# Patient Record
Sex: FEMALE | Race: AMERICAN INDIAN OR ALASKA NATIVE
[De-identification: names, ages, dates, MRNs, and addresses within clinical notes are randomized per-mention and may not be internally consistent; named-entity substitution may affect disease eponyms.]

---

## 2018-05-10 ENCOUNTER — HOSPITAL ENCOUNTER (EMERGENCY)
Dept: HOSPITAL 5 - ED | Age: 73
LOS: 1 days | Discharge: HOME | End: 2018-05-11
Payer: MEDICARE

## 2018-05-10 DIAGNOSIS — E11.22: ICD-10-CM

## 2018-05-10 DIAGNOSIS — R53.81: Primary | ICD-10-CM

## 2018-05-10 DIAGNOSIS — N18.9: ICD-10-CM

## 2018-05-10 DIAGNOSIS — I12.9: ICD-10-CM

## 2018-05-10 LAB
ALBUMIN SERPL-MCNC: 2.4 G/DL (ref 3.9–5)
ALT SERPL-CCNC: 21 UNITS/L (ref 7–56)
BACTERIA #/AREA URNS HPF: (no result) /HPF
BILIRUB UR QL STRIP: (no result)
BLOOD UR QL VISUAL: (no result)
BUN SERPL-MCNC: 65 MG/DL (ref 7–17)
BUN/CREAT SERPL: 18 %
CALCIUM SERPL-MCNC: 8.1 MG/DL (ref 8.4–10.2)
HCT VFR BLD CALC: 31.7 % (ref 30.3–42.9)
HEMOLYSIS INDEX: 7
HGB BLD-MCNC: 9.7 GM/DL (ref 10.1–14.3)
INR PPP: 1.03 (ref 0.87–1.13)
MCH RBC QN AUTO: 24 PG (ref 28–32)
MCHC RBC AUTO-ENTMCNC: 31 % (ref 30–34)
MCV RBC AUTO: 79 FL (ref 79–97)
PH UR STRIP: 7 [PH] (ref 5–7)
PLATELET # BLD: 321 K/MM3 (ref 140–440)
RBC # BLD AUTO: 4 M/MM3 (ref 3.65–5.03)
RBC #/AREA URNS HPF: 15 /HPF (ref 0–6)
UROBILINOGEN UR-MCNC: < 2 MG/DL (ref ?–2)
WBC #/AREA URNS HPF: 150 /HPF (ref 0–6)

## 2018-05-10 PROCEDURE — 96372 THER/PROPH/DIAG INJ SC/IM: CPT

## 2018-05-10 PROCEDURE — 51702 INSERT TEMP BLADDER CATH: CPT

## 2018-05-10 PROCEDURE — 93005 ELECTROCARDIOGRAM TRACING: CPT

## 2018-05-10 PROCEDURE — 82962 GLUCOSE BLOOD TEST: CPT

## 2018-05-10 PROCEDURE — 36415 COLL VENOUS BLD VENIPUNCTURE: CPT

## 2018-05-10 PROCEDURE — 84443 ASSAY THYROID STIM HORMONE: CPT

## 2018-05-10 PROCEDURE — 87086 URINE CULTURE/COLONY COUNT: CPT

## 2018-05-10 PROCEDURE — 83735 ASSAY OF MAGNESIUM: CPT

## 2018-05-10 PROCEDURE — 85610 PROTHROMBIN TIME: CPT

## 2018-05-10 PROCEDURE — 85027 COMPLETE CBC AUTOMATED: CPT

## 2018-05-10 PROCEDURE — 93010 ELECTROCARDIOGRAM REPORT: CPT

## 2018-05-10 PROCEDURE — 71045 X-RAY EXAM CHEST 1 VIEW: CPT

## 2018-05-10 PROCEDURE — 84100 ASSAY OF PHOSPHORUS: CPT

## 2018-05-10 PROCEDURE — 81001 URINALYSIS AUTO W/SCOPE: CPT

## 2018-05-10 PROCEDURE — 80053 COMPREHEN METABOLIC PANEL: CPT

## 2018-05-10 PROCEDURE — 82550 ASSAY OF CK (CPK): CPT

## 2018-05-10 RX ADMIN — AMLODIPINE BESYLATE SCH MG: 10 TABLET ORAL at 22:42

## 2018-05-10 RX ADMIN — CARVEDILOL SCH MG: 25 TABLET, FILM COATED ORAL at 22:42

## 2018-05-10 RX ADMIN — PANTOPRAZOLE SODIUM SCH MG: 40 TABLET, DELAYED RELEASE ORAL at 22:47

## 2018-05-10 NOTE — XRAY REPORT
FINAL REPORT



PROCEDURE:  XR CHEST 1V AP



TECHNIQUE:  Chest radiograph anteroposterior view. CPT 43955







HISTORY:  Weakness 



COMPARISON:  03/11/2018



FINDINGS:  

Heart: Normal.



Mediastinum/Vessels: Prominent central vessels.



Lungs/Pleural space: No infiltrate, effusion, or pneumothorax.



Bony thorax: No acute osseous abnormality.



Life support devices: Left pacemaker.



IMPRESSION:  

No radiographic evidence of acute cardiopulmonary abnormality.

## 2018-05-10 NOTE — EMERGENCY DEPARTMENT REPORT
ED General Adult HPI





- General


Chief complaint: Weakness


Stated complaint: FALL


Time Seen by Provider: 05/10/18 17:11


Source: patient, EMS (verbal report received from EMS.ems notes not available 

at time of  chart dictation), RN notes reviewed, old records reviewed


Mode of arrival: Stretcher


Limitations: Physical Limitation





- History of Present Illness


Initial comments: 





This is a 72-year-old female who is previously unknown to this provider.


Past medical history includes hypertension, pacemaker, diabetes, congestive 

heart failure, chronic kidney disease, failure to thrive, functional 

quadriplegia.








Patient was admitted to this hospital in March and had a prolonged course.


Patient is brought to the hospital by EMS for generalized weakness.  Apparently

, the patient had been recently admitted to [facility] from April 19 to May 9.  Her discharge diagnoses included urinary retention, altered 

mental status, anemia of chronic disease, chronic kidney disease, stage IV, 

morbid obesity and osteoarthritis.


EMS verbally reported to this provider that the patient was at home with her 

family, and the patient was being transferred to a wheelchair, and fell onto 

her butt.  She did not fall and hit her head or her neck.  Family was unable to 

lift up the patient, and therefore called 911.  EMS further reports that it 

took 3 people to get the patient lifted up.  The patient denies headache, neck 

pain, chest pain, abdominal pain, shortness of breath.  Her only complaint is 

bilateral plantar foot "burning" which has been present for months.  He does 

not have exacerbating or relieving factors.























-: Sudden


Consistency: constant


Improves with: none


Worsens with: none


Associated Symptoms: malaise, weakness, other (as per history of present illness

).  denies: confusion, chest pain, cough, diaphoresis, fever/chills, headaches, 

nausea/vomiting, rash, seizure, shortness of breath, syncope





- Related Data


 Previous Rx's











 Medication  Instructions  Recorded  Last Taken  Type


 


Levofloxacin [Levaquin TAB] 250 mg PO Q48HR #4 dose 03/19/18 Unknown Rx


 


predniSONE [Deltasone] 60 mg PO QDAY #30 day 03/19/18 Unknown Rx


 


Acetaminophen [Acetaminophen TAB] 650 mg PO Q4H PRN #30 tablet 05/10/18 Unknown 

Rx


 


Amlodipine Besylate [Norvasc] 10 mg PO QDAY #30 tablet 05/10/18 Unknown Rx


 


AtorvaSTATin [Lipitor] 40 mg PO QHS #30 tablet 05/10/18 Unknown Rx


 


Carvedilol 25 mg PO BID #60 tablet 05/10/18 Unknown Rx


 


Furosemide [Lasix TAB] 40 mg PO BID #60 tablet 05/10/18 Unknown Rx


 


Gabapentin [Neurontin] 100 mg PO TID #90 capsule 05/10/18 Unknown Rx


 


Insulin Glargine,Hum.rec.anlog 20 units SQ QHS #30 vial 05/10/18 Unknown Rx





[Lantus]    


 


Lispro Insulin [Humalog] 1 dose SUB-Q ACHS #100 units 05/10/18 Unknown Rx


 


Loratadine [Claritin] 10 mg PO QDAY #30 tablet 05/10/18 Unknown Rx


 


Omeprazole 40 mg PO QDAY #30 capsule. 05/10/18 Unknown Rx


 


Sodium Bicarbonate 1,300 mg PO BID #30 day 05/10/18 Unknown Rx


 


Zolpidem [Ambien] 5 mg PO QHS PRN #30 tablet 05/10/18 Unknown Rx











 Allergies











Allergy/AdvReac Type Severity Reaction Status Date / Time


 


codeine Allergy  Itching Verified 03/11/18 17:56


 


Penicillins Allergy  Itching Verified 03/11/18 17:56














ED Review of Systems


ROS: 


Stated complaint: FALL


Other details as noted in HPI





Comment: All other systems reviewed and negative





ED Past Medical Hx





- Past Medical History


Hx Hypertension: Yes


Hx Congestive Heart Failure: Yes


Hx Diabetes: Yes


Hx Renal Disease: Yes


Hx Arthritis: Yes


Additional medical history: RENAL FAILURE





- Surgical History


Hx Cholecystectomy: Yes





- Social History


Smoking Status: Never Smoker


Substance Use Type: None





- Medications


Home Medications: 


 Home Medications











 Medication  Instructions  Recorded  Confirmed  Last Taken  Type


 


Levofloxacin [Levaquin TAB] 250 mg PO Q48HR #4 dose 03/19/18 05/10/18 Unknown Rx


 


predniSONE [Deltasone] 60 mg PO QDAY #30 day 03/19/18 05/10/18 Unknown Rx


 


Acetaminophen [Acetaminophen TAB] 650 mg PO Q4H PRN #30 tablet 05/10/18  

Unknown Rx


 


Amlodipine Besylate [Norvasc] 10 mg PO QDAY #30 tablet 05/10/18  Unknown Rx


 


AtorvaSTATin [Lipitor] 40 mg PO QHS #30 tablet 05/10/18  Unknown Rx


 


Carvedilol 25 mg PO BID #60 tablet 05/10/18  Unknown Rx


 


Furosemide [Lasix TAB] 40 mg PO BID #60 tablet 05/10/18  Unknown Rx


 


Gabapentin [Neurontin] 100 mg PO TID #90 capsule 05/10/18  Unknown Rx


 


Insulin Glargine,Hum.rec.anlog 20 units SQ QHS #30 vial 05/10/18  Unknown Rx





[Lantus]     


 


Lispro Insulin [Humalog] 1 dose SUB-Q ACHS #100 units 05/10/18  Unknown Rx


 


Loratadine [Claritin] 10 mg PO QDAY #30 tablet 05/10/18  Unknown Rx


 


Omeprazole 40 mg PO QDAY #30 capsule.dr 05/10/18  Unknown Rx


 


Sodium Bicarbonate 1,300 mg PO BID #30 day 05/10/18  Unknown Rx


 


Zolpidem [Ambien] 5 mg PO QHS PRN #30 tablet 05/10/18  Unknown Rx














ED Physical Exam





- General


Limitations: Physical Limitation, Other (physical limitation, patient has been 

documented to be a functional quadriplegic and is unable to lift herself up.)


General appearance: alert, in no apparent distress, obese





- Head


Head exam: Present: atraumatic, normocephalic





- Eye


Eye exam: Present: normal appearance, EOMI.  Absent: nystagmus





- ENT


ENT exam: Present: normal exam, normal orophraynx, mucous membranes moist, 

normal external ear exam





- Neck


Neck exam: Present: normal inspection, full ROM





- Respiratory


Respiratory exam: Present: normal lung sounds bilaterally.  Absent: respiratory 

distress





- Cardiovascular


Cardiovascular Exam: Present: regular rate, normal rhythm, normal heart sounds.

  Absent: bradycardia, tachycardia, irregular rhythm, systolic murmur, 

diastolic murmur, rubs, gallop





- GI/Abdominal


GI/Abdominal exam: Present: soft, normal bowel sounds.  Absent: distended, 

tenderness, guarding, rebound, rigid, pulsatile mass





- Rectal


Rectal exam: Present: other (patient has stage III sacral ulcers noted.  No 

redness, pus or streaking).  Absent: normal inspection





- 


External exam: Present: normal external exam, other (escorted by nurse Argueta

)





- Extremities Exam


Extremities exam: Present: normal inspection, pedal edema, other (there is no 

palpable cord.  There is a negative Homans sign.).  Absent: calf tenderness





- Back Exam


Back exam: Present: normal inspection, full ROM.  Absent: tenderness, CVA 

tenderness (R), paraspinal tenderness, vertebral tenderness





- Neurological Exam


Neurological exam: Present: alert, CN II-XII intact, other (Extraocular 

movements intact.  Tongue midline.  No facial droop.  Facial sensation intact 

to light touch in the V1, V2, V3 distribution bilaterally.  5 and 5 strength in 

bilateral upper extremities .3 out of 5 strength bilateral lower extremities 

sensation is intact to light touch in 4 extremities.)





- Psychiatric


Psychiatric exam: Present: normal affect, normal mood





- Skin


Skin exam: Present: warm, dry, intact, normal color.  Absent: rash





ED Course


 Vital Signs











  05/10/18 05/10/18 05/10/18





  16:55 17:00 17:02


 


Temperature   98 F


 


Pulse Rate   64


 


Respiratory   16





Rate   


 


Blood Pressure 180/65 180/65 180/65


 


Blood Pressure   





[Right]   


 


O2 Sat by Pulse  97 96





Oximetry   














  05/10/18 05/10/18 05/10/18





  17:10 17:20 17:30


 


Temperature   


 


Pulse Rate   


 


Respiratory   





Rate   


 


Blood Pressure 170/66 170/74 170/74


 


Blood Pressure   





[Right]   


 


O2 Sat by Pulse 98 99 98





Oximetry   














  05/10/18 05/10/18 05/10/18





  17:40 17:50 18:00


 


Temperature   


 


Pulse Rate  92 H 89


 


Respiratory  20 17





Rate   


 


Blood Pressure 173/64 125/97 138/66


 


Blood Pressure   





[Right]   


 


O2 Sat by Pulse 100 98 94





Oximetry   














  05/10/18 05/10/18 05/10/18





  18:10 18:20 18:30


 


Temperature   


 


Pulse Rate 89 86 82


 


Respiratory 18 12 16





Rate   


 


Blood Pressure 138/66 147/100 164/62


 


Blood Pressure   





[Right]   


 


O2 Sat by Pulse 99 99 97





Oximetry   














  05/10/18 05/10/18 05/10/18





  18:36 18:40 18:42


 


Temperature   


 


Pulse Rate 80 86 


 


Respiratory 15 15 15





Rate   


 


Blood Pressure 164/62 164/62 


 


Blood Pressure   





[Right]   


 


O2 Sat by Pulse 98 99 98





Oximetry   














  05/10/18 05/10/18 05/10/18





  18:50 19:00 19:10


 


Temperature   


 


Pulse Rate 86 88 85


 


Respiratory 16 17 18





Rate   


 


Blood Pressure 161/68 171/69 171/69


 


Blood Pressure   





[Right]   


 


O2 Sat by Pulse 98 98 97





Oximetry   














  05/10/18 05/10/18 05/10/18





  19:20 19:30 19:38


 


Temperature   99.4 F


 


Pulse Rate 81 75 


 


Respiratory 16 15 





Rate   


 


Blood Pressure 175/63 149/58 


 


Blood Pressure   





[Right]   


 


O2 Sat by Pulse 98 97 





Oximetry   














  05/10/18 05/10/18 05/10/18





  19:40 19:50 20:00


 


Temperature   


 


Pulse Rate 76 77 75


 


Respiratory 16 15 15





Rate   


 


Blood Pressure 149/58 137/53 151/59


 


Blood Pressure   





[Right]   


 


O2 Sat by Pulse 98 99 97





Oximetry   














  05/10/18 05/11/18 05/11/18





  22:42 04:00 06:19


 


Temperature   97.1 F L


 


Pulse Rate 77 65 71


 


Respiratory  18 18





Rate   


 


Blood Pressure 169/66  


 


Blood Pressure  145/73 135/47





[Right]   


 


O2 Sat by Pulse  99 97





Oximetry   














  05/11/18 05/11/18 05/11/18





  08:00 11:57 12:34


 


Temperature 98.3 F 98.3 F 


 


Pulse Rate 66 64 63


 


Respiratory 18 18 





Rate   


 


Blood Pressure   156/56


 


Blood Pressure 143/47 143/54 





[Right]   


 


O2 Sat by Pulse 100 100 





Oximetry   














  05/11/18





  16:29


 


Temperature 98.3 F


 


Pulse Rate 61


 


Respiratory 16





Rate 


 


Blood Pressure 


 


Blood Pressure 139/47





[Right] 


 


O2 Sat by Pulse 98





Oximetry 














- Reevaluation(s)


Reevaluation #1: 





05/10/18 17:39


Differential diagnosis, including but not limited to: Debility, functional 

paraplegia, chronic kidney disease, pneumonia, urinary tract infection











Assessment and plan: 72-year-old female with acute on chronic debility.  She 

has multiple chronic medical issues which at this time do not appear to be 

acutely decompensated.  Patient has chronic renal insufficiency and congestive 

heart failure.  She has no crackles, rales or JVD.  She is saturating well on 

room air.  She has no respiratory complaints.  She has chronic edema in her 

bilateral lower extremities.








We will check basic laboratory studies, urinalysis.  Rectal temperature was 99.2

.  Patient will also require evaluation by case management.





Reevaluation #2: 





05/10/18 18:40


Patient sleeping in no distress.  Blood pressure in the 140s.  Awaiting for 

nursing team to reconcile patient's medications.  Urinalysis is pending.


Reevaluation #3: 





05/10/18 20:36


Patient continues to rest comfortably.  Urinalysis is contaminated with 

epithelial cells and does not suggest an active infection.  Patient may have 

chronic colonization.  We will continue the patient's previous medications.


Patient is currently waiting K spasm and evaluation for definitive placement.








Reevaluation #4: 





05/11/18 01:29


Vital signs remained stable.  Patient in no distress.  Care is transferred to 

the overnight physician, Dr. Clark, who will in turn transfer care over to the 

morning team to follow up with case management.








ED Medical Decision Making





- Lab Data


Result diagrams: 


 05/10/18 17:47





 05/10/18 17:47








 Vital Signs











  05/10/18





  17:02


 


Temperature 98 F


 


Pulse Rate 64


 


Respiratory 16





Rate 


 


Blood Pressure 180/65


 


O2 Sat by Pulse 96





Oximetry 











Labs











  05/10/18 05/10/18 05/10/18





  17:47 17:47 17:47


 


WBC  11.2 H  


 


RBC  4.00  


 


Hgb  9.7 L  


 


Hct  31.7  


 


MCV  79  


 


MCH  24 L  


 


MCHC  31  


 


RDW  18.5 H  


 


Plt Count  321  


 


PT    14.0


 


INR    1.03


 


Sodium   134 L 


 


Potassium   5.1 H 


 


Chloride   97.0 L 


 


Carbon Dioxide   21 L 


 


Anion Gap   21 


 


BUN   65 H 


 


Creatinine   3.6 H 


 


Estimated GFR   15 


 


BUN/Creatinine Ratio   18 


 


Glucose   199 H 


 


Calcium   8.1 L 


 


Phosphorus   


 


Magnesium   


 


Total Bilirubin   0.40 


 


AST   17 


 


ALT   21 


 


Alkaline Phosphatase   123 


 


Total Creatine Kinase   


 


Total Protein   6.1 L 


 


Albumin   2.4 L 


 


Albumin/Globulin Ratio   0.6 


 


TSH   














  05/10/18 05/10/18





  17:47 17:47


 


WBC  


 


RBC  


 


Hgb  


 


Hct  


 


MCV  


 


MCH  


 


MCHC  


 


RDW  


 


Plt Count  


 


PT  


 


INR  


 


Sodium  


 


Potassium  


 


Chloride  


 


Carbon Dioxide  


 


Anion Gap  


 


BUN  


 


Creatinine  


 


Estimated GFR  


 


BUN/Creatinine Ratio  


 


Glucose  


 


Calcium  


 


Phosphorus  4.40 


 


Magnesium  1.90 


 


Total Bilirubin  


 


AST  


 


ALT  


 


Alkaline Phosphatase  


 


Total Creatine Kinase  104 


 


Total Protein  


 


Albumin  


 


Albumin/Globulin Ratio  


 


TSH   4.970 H














- EKG Data


-: EKG Interpreted by Me





- EKG Data


When compared to previous EKG there are: no significant change





05/10/18 18:31











Ventricular paced rhythm, left axis deviation, rate of 89, not having chest pain

, abnormal EKG, not morphologically consistent with a STEMI.  Unchanged from 

prior EKG.





- Radiology Data


Radiology results: report reviewed, image reviewed





X-ray of the chest, interpreted by myself and radiology: No acute disease


Critical care attestation.: 


If time is entered above; I have spent that time in minutes in the direct care 

of this critically ill patient, excluding procedure time.








ED Disposition


Clinical Impression: 


 Debility, Chronic renal insufficiency





Disposition: DC-01 TO HOME OR SELFCARE


Is pt being admited?: No


Does the pt Need Aspirin: No


Condition: Good


Additional Instructions: 


Continue current outpatient medications.  Follow up with the primary care 

doctor or nephrology doctor within the next 2 weeks.  Return to the ER right 

away with fevers, chills, lethargy, irritability, projectile vomiting, change 

in mental status, confusion, inability to tolerate liquid feeds.


Prescriptions: 


AtorvaSTATin [Lipitor] 40 mg PO QHS #30 tablet


Insulin Glargine,Hum.rec.anlog [Lantus] 20 units SQ QHS #30 vial


Zolpidem [Ambien] 5 mg PO QHS PRN #30 tablet


 PRN Reason: Sleep


Acetaminophen [Acetaminophen TAB] 650 mg PO Q4H PRN #30 tablet


 PRN Reason: Pain MILD(1-3)/Fever >100.5/HA


Amlodipine Besylate [Norvasc] 10 mg PO QDAY #30 tablet


Carvedilol 25 mg PO BID #60 tablet


Furosemide [Lasix TAB] 40 mg PO BID #60 tablet


Gabapentin [Neurontin] 100 mg PO TID #90 capsule


Lispro Insulin [Humalog] 1 dose SUB-Q ACHS #100 units


Loratadine [Claritin] 10 mg PO QDAY #30 tablet


Omeprazole 40 mg PO QDAY #30 capsule.


Sodium Bicarbonate 1,300 mg PO BID #30 day


Referrals: 


ARTURO DEL RIO MD [Primary Care Provider] - 3-5 Days


CHRIS BRADLEY MD [Staff Physician] - 3-5 Days

## 2018-05-11 VITALS — SYSTOLIC BLOOD PRESSURE: 139 MMHG | DIASTOLIC BLOOD PRESSURE: 47 MMHG

## 2018-05-11 RX ADMIN — PANTOPRAZOLE SODIUM SCH MG: 40 TABLET, DELAYED RELEASE ORAL at 12:34

## 2018-05-11 RX ADMIN — AMLODIPINE BESYLATE SCH MG: 10 TABLET ORAL at 12:34

## 2018-05-11 RX ADMIN — CARVEDILOL SCH MG: 25 TABLET, FILM COATED ORAL at 12:34
